# Patient Record
Sex: MALE | Race: WHITE
[De-identification: names, ages, dates, MRNs, and addresses within clinical notes are randomized per-mention and may not be internally consistent; named-entity substitution may affect disease eponyms.]

---

## 2018-05-02 ENCOUNTER — HOSPITAL ENCOUNTER (OUTPATIENT)
Dept: HOSPITAL 84 - OBSVTIME | Age: 63
LOS: 2 days | Discharge: HOME | End: 2018-05-04
Attending: ORTHOPAEDIC SURGERY
Payer: OTHER GOVERNMENT

## 2018-05-02 VITALS — SYSTOLIC BLOOD PRESSURE: 129 MMHG | DIASTOLIC BLOOD PRESSURE: 69 MMHG

## 2018-05-02 VITALS — DIASTOLIC BLOOD PRESSURE: 86 MMHG | SYSTOLIC BLOOD PRESSURE: 151 MMHG

## 2018-05-02 VITALS — HEIGHT: 73 IN | BODY MASS INDEX: 22.53 KG/M2 | WEIGHT: 170 LBS

## 2018-05-02 DIAGNOSIS — F17.200: ICD-10-CM

## 2018-05-02 DIAGNOSIS — Z01.812: ICD-10-CM

## 2018-05-02 DIAGNOSIS — S52.021A: Primary | ICD-10-CM

## 2018-05-02 LAB
ALBUMIN SERPL-MCNC: 3.8 G/DL (ref 3.4–5)
ALP SERPL-CCNC: 69 U/L (ref 46–116)
ALT SERPL-CCNC: 33 U/L (ref 10–68)
ANION GAP SERPL CALC-SCNC: 14.3 MMOL/L (ref 8–16)
APTT BLD: 28.2 SECONDS (ref 22.8–39.4)
BASOPHILS NFR BLD AUTO: 0.2 % (ref 0–2)
BILIRUB SERPL-MCNC: 0.75 MG/DL (ref 0.2–1.3)
BUN SERPL-MCNC: 8 MG/DL (ref 7–18)
CALCIUM SERPL-MCNC: 9.3 MG/DL (ref 8.5–10.1)
CHLORIDE SERPL-SCNC: 90 MMOL/L (ref 98–107)
CO2 SERPL-SCNC: 24.1 MMOL/L (ref 21–32)
CREAT SERPL-MCNC: 0.7 MG/DL (ref 0.6–1.3)
EOSINOPHIL NFR BLD: 0.2 % (ref 0–7)
ERYTHROCYTE [DISTWIDTH] IN BLOOD BY AUTOMATED COUNT: 14.8 % (ref 11.5–14.5)
GLOBULIN SER-MCNC: 4.9 G/L
GLUCOSE SERPL-MCNC: 81 MG/DL (ref 74–106)
HCT VFR BLD CALC: 36.8 % (ref 42–54)
HGB BLD-MCNC: 13.1 G/DL (ref 13.5–17.5)
IMM GRANULOCYTES NFR BLD: 0.3 % (ref 0–5)
INR PPP: 0.99 (ref 0.85–1.17)
LYMPHOCYTES NFR BLD AUTO: 19.2 % (ref 15–50)
MCH RBC QN AUTO: 31.5 PG (ref 26–34)
MCHC RBC AUTO-ENTMCNC: 35.6 G/DL (ref 31–37)
MCV RBC: 88.5 FL (ref 80–100)
MONOCYTES NFR BLD: 15.2 % (ref 2–11)
NEUTROPHILS NFR BLD AUTO: 64.9 % (ref 40–80)
OSMOLALITY SERPL CALC.SUM OF ELEC: 246 MOSM/KG (ref 275–300)
PLATELET # BLD: 240 10X3/UL (ref 130–400)
PMV BLD AUTO: 8.8 FL (ref 7.4–10.4)
POTASSIUM SERPL-SCNC: 4.4 MMOL/L (ref 3.5–5.1)
PROT SERPL-MCNC: 8.7 G/DL (ref 6.4–8.2)
PROTHROMBIN TIME: 12.7 SECONDS (ref 11.6–15)
RBC # BLD AUTO: 4.16 10X6/UL (ref 4.2–6.1)
SODIUM SERPL-SCNC: 124 MMOL/L (ref 136–145)
WBC # BLD AUTO: 6.6 10X3/UL (ref 4.8–10.8)

## 2018-05-03 VITALS — SYSTOLIC BLOOD PRESSURE: 164 MMHG | DIASTOLIC BLOOD PRESSURE: 95 MMHG

## 2018-05-03 VITALS — DIASTOLIC BLOOD PRESSURE: 68 MMHG | SYSTOLIC BLOOD PRESSURE: 134 MMHG

## 2018-05-03 VITALS — DIASTOLIC BLOOD PRESSURE: 74 MMHG | SYSTOLIC BLOOD PRESSURE: 158 MMHG

## 2018-05-03 VITALS — SYSTOLIC BLOOD PRESSURE: 134 MMHG | DIASTOLIC BLOOD PRESSURE: 68 MMHG

## 2018-05-03 VITALS — DIASTOLIC BLOOD PRESSURE: 86 MMHG | SYSTOLIC BLOOD PRESSURE: 168 MMHG

## 2018-05-03 VITALS — DIASTOLIC BLOOD PRESSURE: 84 MMHG | SYSTOLIC BLOOD PRESSURE: 152 MMHG

## 2018-05-03 VITALS — SYSTOLIC BLOOD PRESSURE: 121 MMHG | DIASTOLIC BLOOD PRESSURE: 62 MMHG

## 2018-05-03 VITALS — DIASTOLIC BLOOD PRESSURE: 61 MMHG | SYSTOLIC BLOOD PRESSURE: 125 MMHG

## 2018-05-03 LAB
ALBUMIN SERPL-MCNC: 2.8 G/DL (ref 3.4–5)
ALP SERPL-CCNC: 56 U/L (ref 46–116)
ALT SERPL-CCNC: 25 U/L (ref 10–68)
ANION GAP SERPL CALC-SCNC: 13.2 MMOL/L (ref 8–16)
ANISOCYTOSIS BLD QL SMEAR: (no result)
BILIRUB SERPL-MCNC: 0.53 MG/DL (ref 0.2–1.3)
BUN SERPL-MCNC: 8 MG/DL (ref 7–18)
CALCIUM SERPL-MCNC: 8.7 MG/DL (ref 8.5–10.1)
CHLORIDE SERPL-SCNC: 94 MMOL/L (ref 98–107)
CO2 SERPL-SCNC: 24.6 MMOL/L (ref 21–32)
CREAT SERPL-MCNC: 0.6 MG/DL (ref 0.6–1.3)
ERYTHROCYTE [DISTWIDTH] IN BLOOD BY AUTOMATED COUNT: 14.9 % (ref 11.5–14.5)
GLOBULIN SER-MCNC: 4.4 G/L
GLUCOSE SERPL-MCNC: 80 MG/DL (ref 74–106)
HCT VFR BLD CALC: 32.8 % (ref 42–54)
HGB BLD-MCNC: 11.4 G/DL (ref 13.5–17.5)
LYMPHOCYTES NFR BLD AUTO: 31 % (ref 15–50)
MCH RBC QN AUTO: 31.1 PG (ref 26–34)
MCHC RBC AUTO-ENTMCNC: 34.8 G/DL (ref 31–37)
MCV RBC: 89.4 FL (ref 80–100)
MONOCYTES NFR BLD: 22 % (ref 2–11)
NEUTROPHILS NFR BLD AUTO: 44 % (ref 40–80)
OSMOLALITY SERPL CALC.SUM OF ELEC: 253 MOSM/KG (ref 275–300)
PLATELET # BLD EST: NORMAL 10*3/UL
PLATELET # BLD: 230 10X3/UL (ref 130–400)
PMV BLD AUTO: 9.6 FL (ref 7.4–10.4)
POTASSIUM SERPL-SCNC: 3.8 MMOL/L (ref 3.5–5.1)
PROT SERPL-MCNC: 7.2 G/DL (ref 6.4–8.2)
RBC # BLD AUTO: 3.67 10X6/UL (ref 4.2–6.1)
SODIUM SERPL-SCNC: 128 MMOL/L (ref 136–145)
WBC # BLD AUTO: 4.4 10X3/UL (ref 4.8–10.8)

## 2018-05-04 VITALS — SYSTOLIC BLOOD PRESSURE: 134 MMHG | DIASTOLIC BLOOD PRESSURE: 64 MMHG

## 2018-05-04 VITALS — SYSTOLIC BLOOD PRESSURE: 101 MMHG | DIASTOLIC BLOOD PRESSURE: 58 MMHG

## 2018-05-04 VITALS — SYSTOLIC BLOOD PRESSURE: 134 MMHG | DIASTOLIC BLOOD PRESSURE: 72 MMHG

## 2018-05-04 LAB
HCT VFR BLD CALC: 30.5 % (ref 42–54)
HGB BLD-MCNC: 10.3 G/DL (ref 13.5–17.5)

## 2020-09-14 ENCOUNTER — HOSPITAL ENCOUNTER (INPATIENT)
Dept: HOSPITAL 84 - D.ER | Age: 65
LOS: 2 days | Discharge: HOME | DRG: 183 | End: 2020-09-16
Attending: FAMILY MEDICINE | Admitting: FAMILY MEDICINE
Payer: OTHER GOVERNMENT

## 2020-09-14 VITALS — SYSTOLIC BLOOD PRESSURE: 175 MMHG | DIASTOLIC BLOOD PRESSURE: 97 MMHG

## 2020-09-14 VITALS — SYSTOLIC BLOOD PRESSURE: 196 MMHG | DIASTOLIC BLOOD PRESSURE: 112 MMHG

## 2020-09-14 VITALS — SYSTOLIC BLOOD PRESSURE: 191 MMHG | DIASTOLIC BLOOD PRESSURE: 93 MMHG

## 2020-09-14 VITALS — HEIGHT: 73 IN | BODY MASS INDEX: 23.24 KG/M2 | WEIGHT: 175.37 LBS

## 2020-09-14 VITALS — SYSTOLIC BLOOD PRESSURE: 156 MMHG | DIASTOLIC BLOOD PRESSURE: 90 MMHG

## 2020-09-14 VITALS — DIASTOLIC BLOOD PRESSURE: 97 MMHG | SYSTOLIC BLOOD PRESSURE: 175 MMHG

## 2020-09-14 DIAGNOSIS — D50.9: ICD-10-CM

## 2020-09-14 DIAGNOSIS — E87.1: ICD-10-CM

## 2020-09-14 DIAGNOSIS — F17.200: ICD-10-CM

## 2020-09-14 DIAGNOSIS — S22.49XA: Primary | ICD-10-CM
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
## 2020-09-14 DIAGNOSIS — J96.01: ICD-10-CM

## 2020-09-14 DIAGNOSIS — F10.20: ICD-10-CM

## 2020-09-15 VITALS — SYSTOLIC BLOOD PRESSURE: 137 MMHG | DIASTOLIC BLOOD PRESSURE: 77 MMHG

## 2020-09-15 VITALS — DIASTOLIC BLOOD PRESSURE: 61 MMHG | SYSTOLIC BLOOD PRESSURE: 112 MMHG

## 2020-09-15 VITALS — SYSTOLIC BLOOD PRESSURE: 140 MMHG | DIASTOLIC BLOOD PRESSURE: 80 MMHG

## 2020-09-15 VITALS — DIASTOLIC BLOOD PRESSURE: 78 MMHG | SYSTOLIC BLOOD PRESSURE: 146 MMHG

## 2020-09-15 VITALS — SYSTOLIC BLOOD PRESSURE: 136 MMHG | DIASTOLIC BLOOD PRESSURE: 72 MMHG

## 2020-09-15 VITALS — DIASTOLIC BLOOD PRESSURE: 72 MMHG | SYSTOLIC BLOOD PRESSURE: 121 MMHG

## 2020-09-15 LAB
ANION GAP SERPL CALC-SCNC: 11 MMOL/L (ref 8–16)
APTT BLD: 32.8 SECONDS (ref 22.8–39.4)
BASOPHILS NFR BLD AUTO: 0 % (ref 0–2)
BUN SERPL-MCNC: 10 MG/DL (ref 7–18)
CALCIUM SERPL-MCNC: 8.4 MG/DL (ref 8.5–10.1)
CHLORIDE SERPL-SCNC: 90 MMOL/L (ref 98–107)
CO2 SERPL-SCNC: 26.2 MMOL/L (ref 21–32)
CREAT SERPL-MCNC: 0.8 MG/DL (ref 0.6–1.3)
EOSINOPHIL NFR BLD: 0.2 % (ref 0–7)
ERYTHROCYTE [DISTWIDTH] IN BLOOD BY AUTOMATED COUNT: 19.9 % (ref 11.5–14.5)
GLUCOSE SERPL-MCNC: 85 MG/DL (ref 74–106)
HCT VFR BLD CALC: 29.9 % (ref 42–54)
HGB BLD-MCNC: 9.6 G/DL (ref 13.5–17.5)
IMM GRANULOCYTES NFR BLD: 0.3 % (ref 0–5)
INR PPP: 0.95 (ref 0.85–1.17)
LYMPHOCYTES NFR BLD AUTO: 15.5 % (ref 15–50)
MAGNESIUM SERPL-MCNC: 2 MG/DL (ref 1.8–2.4)
MCH RBC QN AUTO: 24.6 PG (ref 26–34)
MCHC RBC AUTO-ENTMCNC: 32.1 G/DL (ref 31–37)
MCV RBC: 76.5 FL (ref 80–100)
MONOCYTES NFR BLD: 14.5 % (ref 2–11)
NEUTROPHILS NFR BLD AUTO: 69.5 % (ref 40–80)
OSMOLALITY SERPL CALC.SUM OF ELEC: 245 MOSM/KG (ref 275–300)
PHOSPHATE SERPL-MCNC: 2.5 MG/DL (ref 2.5–4.9)
PLATELET # BLD: 246 10X3/UL (ref 130–400)
PMV BLD AUTO: 9.2 FL (ref 7.4–10.4)
POTASSIUM SERPL-SCNC: 4.2 MMOL/L (ref 3.5–5.1)
PROTHROMBIN TIME: 12.7 SECONDS (ref 11.6–15)
RBC # BLD AUTO: 3.91 10X6/UL (ref 4.2–6.1)
SODIUM SERPL-SCNC: 123 MMOL/L (ref 136–145)
WBC # BLD AUTO: 6.1 10X3/UL (ref 4.8–10.8)

## 2020-09-15 NOTE — NUR
COVID TEST COMPLETED, PATIENT TOLERATED WELL, WILL CONTNUE TO MONITOR PATIENT,
CALL LIGHT WITHIN REACH

## 2020-09-15 NOTE — NUR
ASSUMED CARE OF PATIENT, PATIENT AAOX4, PATIENT STATED THAT EARLIER HE
AMBULATED TO BR WITHOUT ASSIST AND SLIPPED BUT CAUGHT BAR ON WALL BESIDE STOOL
TO PREVENT FALLING, PATIENT STATES HE IS OK, INSTRUCTED PATIENT TO USE CALL
LIGHT FOR ASSISTANCE, PATIENT VERBALIZED UNDERSTANDING, DENIES NEEDS AT THIS
TIME, WILL CONTINUE TO MONITOR PATIENT, CALL LIGHT WITHIN REACH

## 2020-09-16 VITALS — DIASTOLIC BLOOD PRESSURE: 81 MMHG | SYSTOLIC BLOOD PRESSURE: 153 MMHG

## 2020-09-16 VITALS — DIASTOLIC BLOOD PRESSURE: 62 MMHG | SYSTOLIC BLOOD PRESSURE: 116 MMHG

## 2020-09-16 VITALS — DIASTOLIC BLOOD PRESSURE: 84 MMHG | SYSTOLIC BLOOD PRESSURE: 152 MMHG

## 2020-09-16 VITALS — SYSTOLIC BLOOD PRESSURE: 147 MMHG | DIASTOLIC BLOOD PRESSURE: 80 MMHG

## 2020-09-16 LAB
ALBUMIN SERPL-MCNC: 2.6 G/DL (ref 3.4–5)
ALP SERPL-CCNC: 83 U/L (ref 30–120)
ALT SERPL-CCNC: 27 U/L (ref 10–68)
ANION GAP SERPL CALC-SCNC: 11.1 MMOL/L (ref 8–16)
BASOPHILS NFR BLD AUTO: 0.2 % (ref 0–2)
BILIRUB SERPL-MCNC: 0.41 MG/DL (ref 0.2–1.3)
BUN SERPL-MCNC: 13 MG/DL (ref 7–18)
CALCIUM SERPL-MCNC: 8.2 MG/DL (ref 8.5–10.1)
CHLORIDE SERPL-SCNC: 93 MMOL/L (ref 98–107)
CO2 SERPL-SCNC: 25.8 MMOL/L (ref 21–32)
CREAT SERPL-MCNC: 0.8 MG/DL (ref 0.6–1.3)
EOSINOPHIL NFR BLD: 0.8 % (ref 0–7)
ERYTHROCYTE [DISTWIDTH] IN BLOOD BY AUTOMATED COUNT: 19.8 % (ref 11.5–14.5)
GLOBULIN SER-MCNC: 4.5 G/L
GLUCOSE SERPL-MCNC: 134 MG/DL (ref 74–106)
HCT VFR BLD CALC: 28.1 % (ref 42–54)
HGB BLD-MCNC: 9.1 G/DL (ref 13.5–17.5)
IMM GRANULOCYTES NFR BLD: 0.6 % (ref 0–5)
LYMPHOCYTES NFR BLD AUTO: 18.9 % (ref 15–50)
MCH RBC QN AUTO: 24.7 PG (ref 26–34)
MCHC RBC AUTO-ENTMCNC: 32.4 G/DL (ref 31–37)
MCV RBC: 76.4 FL (ref 80–100)
MONOCYTES NFR BLD: 14.5 % (ref 2–11)
NEUTROPHILS NFR BLD AUTO: 65 % (ref 40–80)
OSMOLALITY SERPL CALC.SUM OF ELEC: 254 MOSM/KG (ref 275–300)
PLATELET # BLD: 229 10X3/UL (ref 130–400)
PMV BLD AUTO: 9.2 FL (ref 7.4–10.4)
POTASSIUM SERPL-SCNC: 3.9 MMOL/L (ref 3.5–5.1)
PROT SERPL-MCNC: 7.1 G/DL (ref 6.4–8.2)
RBC # BLD AUTO: 3.68 10X6/UL (ref 4.2–6.1)
SODIUM SERPL-SCNC: 126 MMOL/L (ref 136–145)
WBC # BLD AUTO: 6.3 10X3/UL (ref 4.8–10.8)

## 2020-09-16 NOTE — MORECARE
CASE MANAGEMENT DISCHARGE SUMMARY
 
 
PATIENT: JOHNSON HOFFMANN                       UNIT: [id number]
ACCOUNT#: [id number]                       ADM DATE: 09/15/20
AGE: 65     : 55  SEX: M            ROOM/BED: D.2217    
AUTHOR: MARIELENA LACY                             PHYSICIAN:                               
 
REFERRING PHYSICIAN: FERNANDO GUZMAN MD               
DATE OF SERVICE: 20
Discharge Plan
 
 
Patient Name: JOHNSON HOFFMANN
Facility: [facility]:[place]
Encounter #: [id number]
Medical Record #: [id number]
: 1955
Planned Disposition: Home or Self Care
Anticipated Discharge Date: 
 
Discharge Date: 
Expected LOS: 
Initial Reviewer: [email or web address]
Initial Review Date: 2020
Generated: 20   2:20 pm 
Comments
 
DCP- Discharge Planning
 
Updated by [id number]: Shyann Mackey on 20  12:17 pm CT
ETOH EDUCATION/ RESOURCES GIVEN TO PATIENT FOR HIS DISCHARGE
  
 
 
 
 
 
 
 
Patient Name: JOHNSON HOFFMANN
 
Encounter #: [id number]
Page [id number]
 
 
 
 
 
Electronically Signed by MARIELENA LACY on 20 at 1321
 
 
 
 
 
 
**All edits/amendments must be made on the electronic document**
 
DICTATION DATE: 20 1320     : PENNIE  20 1320     
RPT#: [id number]                                DC DATE:        
                                               STATUS: ADM IN  
[facility]
 [place] [address]
***END OF REPORT***

## 2020-09-16 NOTE — NUR
PT WALKED WITH NURSE AROUND THE FLOOR (~250FT) WITHOUT NC/O2. WHEN LAP AROUND
HALLWAY WAS COMPLETED HIS SPO2 WAS AT 95%. NO DISTRESS NOTED. BREATHING
NORMAL. PT REPORTS NO PAIN OR DIFFICULTY BREATHING. CONTINUING TO MONITOR

## 2020-09-17 NOTE — MORECARE
CASE MANAGEMENT DISCHARGE SUMMARY
 
 
PATIENT: JOHNSON HOFFMANN                       UNIT: [id number]
ACCOUNT#: [id number]                       ADM DATE: 09/15/20
AGE: 65     : 55  SEX: M            ROOM/BED: D.2217    
AUTHOR: MARIELENA LACY                             PHYSICIAN:                               
 
REFERRING PHYSICIAN: FERNANDO GUZMAN MD               
DATE OF SERVICE: 20
Discharge Plan
 
 
Patient Name: JOHNSON HOFFMANN
[facility]
Encounter #: [id number]
Medical Record #: [id number]
: 1955
Planned Disposition: Home or Self Care
Anticipated Discharge Date: 
 
Discharge Date: 2020
Expected LOS: 
Initial Reviewer: [email or web address]
Initial Review Date: 2020
Generated: 20   6:13 pm 
Comments
 
DCP- Discharge Planning
 
Updated by [email or web address]: Shyann Mackey on 20  12:17 pm CT
ETOH EDUCATION/ RESOURCES GIVEN TO PATIENT FOR HIS DISCHARGE
  
 
 
 
 
 
 
 
 
Last DP export: 20  12:20 p
Patient Name: JOHNSON HOFFMANN
 
Encounter #: [id number]
Page [id number]
 
 
 
 
 
Electronically Signed by MARIELENA LACY on 20 at 1714
 
 
 
 
 
 
**All edits/amendments must be made on the electronic document**
 
DICTATION DATE: 20     : PENNIE  20     
RPT#: [id number]                                DC DATE:20
                                               STATUS: DIS IN  
[facility]
 [place] [address]
***END OF REPORT***